# Patient Record
Sex: FEMALE | Race: WHITE | NOT HISPANIC OR LATINO | Employment: OTHER | URBAN - METROPOLITAN AREA
[De-identification: names, ages, dates, MRNs, and addresses within clinical notes are randomized per-mention and may not be internally consistent; named-entity substitution may affect disease eponyms.]

---

## 2022-08-29 ENCOUNTER — OFFICE VISIT (OUTPATIENT)
Dept: NEPHROLOGY | Facility: CLINIC | Age: 77
End: 2022-08-29
Payer: MEDICARE

## 2022-08-29 VITALS
HEART RATE: 74 BPM | BODY MASS INDEX: 25.16 KG/M2 | WEIGHT: 147.38 LBS | TEMPERATURE: 98 F | DIASTOLIC BLOOD PRESSURE: 70 MMHG | OXYGEN SATURATION: 100 % | HEIGHT: 64 IN | SYSTOLIC BLOOD PRESSURE: 155 MMHG

## 2022-08-29 DIAGNOSIS — N18.4 STAGE 4 CHRONIC KIDNEY DISEASE: Primary | ICD-10-CM

## 2022-08-29 PROCEDURE — 99203 PR OFFICE/OUTPT VISIT, NEW, LEVL III, 30-44 MIN: ICD-10-PCS | Mod: S$GLB,,, | Performed by: INTERNAL MEDICINE

## 2022-08-29 PROCEDURE — 99203 OFFICE O/P NEW LOW 30 MIN: CPT | Mod: S$GLB,,, | Performed by: INTERNAL MEDICINE

## 2022-08-29 RX ORDER — CETIRIZINE HYDROCHLORIDE 10 MG/1
10 TABLET ORAL DAILY
COMMUNITY
End: 2023-10-18

## 2022-08-29 RX ORDER — CANDESARTAN 16 MG/1
16 TABLET ORAL DAILY
COMMUNITY
Start: 2022-07-20 | End: 2023-10-18

## 2022-08-29 NOTE — PROGRESS NOTES
"Nephrology Progress Note      Patient Name:  Rosaline Lutz    :  1945    Medical Record:  [id number]    Interval History:  Sent here for elevated cr done last month. Hadn't had blood checked before that for yrs. Had covid on .      Subjective:  Patient seen and examined. No complaints.    HTN x 2 yrs.    Current Medications: Current medications reviewed.    Review of Systems  Review of Systems   HENT:  Negative for hearing loss.    Eyes:  Negative for blurred vision, double vision, photophobia and pain.   Respiratory:  Negative for cough, hemoptysis, sputum production, shortness of breath and wheezing.    Cardiovascular:  Negative for chest pain, palpitations, orthopnea, claudication, leg swelling and PND.   Gastrointestinal:  Negative for abdominal pain, blood in stool, constipation, diarrhea, heartburn, melena, nausea and vomiting.   Genitourinary:  Negative for dysuria, flank pain, frequency, hematuria and urgency.   Musculoskeletal:  Negative for myalgias and neck pain.   Skin:  Negative for rash.   Neurological:  Negative for dizziness.   Endo/Heme/Allergies:  Does not bruise/bleed easily.     Objective:      Physical Exam:   Vital Signs:  BP (!) 155/70 (BP Location: Left arm, Patient Position: Sitting, BP Method: Medium (Automatic))   Pulse 74   Temp 98.3 °F (36.8 °C) (Oral)   Ht 5' 4" (1.626 m)   Wt 66.9 kg (147 lb 6.4 oz)   SpO2 100%   BMI 25.30 kg/m²   Constitutional:  WNL  HENT:  Normocephalic, Atraumatic, Pupils Reactive, Normal oropharynx, Nose normal.   Cardiovascular:  Normal heart rate, Normal rhythm, No murmurs, No rubs, No gallops.   Respiratory:  Normal breath sounds, No respiratory distress, No wheezing, No chest tenderness.   GI:  Bowel sounds normal, Soft, No tenderness, No masses, No pulsatile masses.  : No costovertebral angle tenderness    Extremities:  Intact distal pulses, No edema, No tenderness, No cyanosis, No clubbing.   Neurologic:  Alert & oriented x 3, Normal " motor function, Normal sensory function, No focal deficits noted.  Skin:  Warm and dry      Labs:  No results for input(s): K, CO2, BUN, ALBUMIN, CALCIUM, GLU, HGB, WBC, PLT, INR in the last 72 hours.    Invalid input(s): CRE  .    Radiology:   US noted. - c/w CKD.    Assessment:  Ckd 4 - little protein. Suspect from HTN. Agree with MM work up. If MM workup is neg - will embark on more workup.        Plan:  RTC 2 months.    Electronically signed by: Jeanette Celis, 8/29/2022 2:14 PM.

## 2023-10-06 DIAGNOSIS — N18.4 CHRONIC KIDNEY DISEASE, STAGE IV (SEVERE): Primary | ICD-10-CM

## 2023-10-18 ENCOUNTER — OFFICE VISIT (OUTPATIENT)
Dept: NEPHROLOGY | Facility: CLINIC | Age: 78
End: 2023-10-18
Payer: MEDICARE

## 2023-10-18 VITALS
OXYGEN SATURATION: 95 % | SYSTOLIC BLOOD PRESSURE: 162 MMHG | BODY MASS INDEX: 21.71 KG/M2 | DIASTOLIC BLOOD PRESSURE: 72 MMHG | HEART RATE: 65 BPM | WEIGHT: 127.19 LBS | HEIGHT: 64 IN

## 2023-10-18 DIAGNOSIS — I10 PRIMARY HYPERTENSION: ICD-10-CM

## 2023-10-18 DIAGNOSIS — E83.52 HYPERCALCEMIA: ICD-10-CM

## 2023-10-18 DIAGNOSIS — N18.4 STAGE 4 CHRONIC KIDNEY DISEASE: Primary | ICD-10-CM

## 2023-10-18 PROCEDURE — 99214 OFFICE O/P EST MOD 30 MIN: CPT | Mod: S$GLB,,, | Performed by: INTERNAL MEDICINE

## 2023-10-18 PROCEDURE — 99214 PR OFFICE/OUTPT VISIT, EST, LEVL IV, 30-39 MIN: ICD-10-PCS | Mod: S$GLB,,, | Performed by: INTERNAL MEDICINE

## 2023-10-18 RX ORDER — PANTOPRAZOLE SODIUM 40 MG/1
40 TABLET, DELAYED RELEASE ORAL DAILY
COMMUNITY

## 2023-10-18 RX ORDER — AMLODIPINE BESYLATE 5 MG/1
5 TABLET ORAL DAILY
COMMUNITY

## 2023-10-18 NOTE — PROGRESS NOTES
Pt Name:  Rosaline Lutz  Pt :  1945  Pt MRN:  [id number]    Date: 10/18/2023  Provider: Jeanette Celis    Reason for visit:       Chief Complaint:   Chief Complaint   Patient presents with    Chronic Kidney Disease     Cre:2.21 gfr:22 CKD stage:4       HPI:  HPI       History:   Past Medical History:   Diagnosis Date    CKD (chronic kidney disease)     COVID-19     HLD (hyperlipidemia)     HTN (hypertension)     Vitamin D deficiency      Past Surgical History:   Procedure Laterality Date    CATARACT EXTRACTION Bilateral     CHOLECYSTECTOMY       Family History   Problem Relation Age of Onset    Dementia Mother     Diabetes Father     Heart disease Father     Diabetes Sister     Kidney failure Sister      Social History     Substance and Sexual Activity   Alcohol Use Never     Social History     Substance and Sexual Activity   Drug Use Never     Social History     Substance and Sexual Activity   Sexual Activity Not Currently     reports that she is not currently sexually active.  Social History     Tobacco Use   Smoking Status Never   Smokeless Tobacco Never       Allergies:  Review of patient's allergies indicates:   Allergen Reactions    Propoxyphene Other (See Comments)     Relaxed muscle in face       Current Outpatient Medications   Medication Sig Dispense Refill    amLODIPine (NORVASC) 5 MG tablet Take 5 mg by mouth once daily.      pantoprazole (PROTONIX) 40 MG tablet Take 40 mg by mouth once daily.       No current facility-administered medications for this visit.        ROS:   Constitutional:  Denies fever or chills   Eyes:  Denies change in visual acuity   HENT:  Denies nasal congestion or sore throat   Respiratory:  As in the history of the present illness.   Cardiovascular:  As in the history of the present illness.   GI:  As in the history of the present illness.    Musculoskeletal:  Denies back pain or joint pain   Integument:  Denies rash   Neurologic:  Denies headache, focal weakness or sensory  "changes   Endocrine:  Denies polyuria or polydipsia   Lymphatic:  Denies swollen glands   Psychiatric:  Denies depression or anxiety    Physical Exam:   Vitals:   Vitals:    10/18/23 1415   BP: (!) 162/72   Pulse: 65   SpO2: 95%   Weight: 57.7 kg (127 lb 3.2 oz)   Height: 5' 4" (1.626 m)     Body mass index is 21.83 kg/m².    Constitutional:  Well developed, well nourished, and in no acute distress   Eyes:  PERRLA, conjunctiva normal   HENT:  Atraumatic, external ears normal, nose normal.  Neck: There is no jugular venous distension or thyromegaly.   Respiratory:  No respiratory distress, normal breath sounds, no rales, no wheezing   Cardiovascular:  Normal rate, and a regular rhythm, no murmurs, no gallops, no rub, and no edema.  GI:  Normal bowel sounds.  Musculoskeletal:  No deformities.   Neurologic:  Alert & oriented x 3, CN 2-12 normal, normal motor function, and no asterixis.   Psychiatric:  Speech and behavior appropriate.    Labs/Tests:  Lab Results   Component Value Date     08/26/2022    K 4.6 08/26/2022     08/26/2022    CO2 27 08/26/2022    BUN 21 08/26/2022    CREATININE 1.97 (H) 08/26/2022    GLUCOSE 94 08/26/2022    CALCIUM 10.1 08/26/2022    PHOSPHORUS 3.3 08/26/2022    ALBUMIN 3.8 08/26/2022    EGFRNONAA 24 (L) 08/26/2022    ESTGFRAFRICA 28 (L) 08/26/2022    PTH 23 10/13/2023    IRON 82 09/25/2023    TIBC 280 09/25/2023    FERRITIN 210.0 09/25/2023       Assessment & Plan:    Visit Diagnosis:   1. Stage 4 chronic kidney disease  Renal Function Panel    PTH, Intact    CBC Auto Differential    Renal Function Panel    PTH, Intact    CBC Auto Differential      2. Primary hypertension        3. Hypercalcemia           Problem List: There is no problem list on file for this patient.  -  Ckd 4 - stable. - prob from htn    Htn    Hypercalcemia - seeing onc. Pt has normal pth.    1. Stage 4 chronic kidney disease  -     Renal Function Panel; Future; Expected date: 01/18/2024  -     PTH, Intact; " Future; Expected date: 01/18/2024  -     CBC Auto Differential; Future; Expected date: 01/18/2024    2. Primary hypertension    3. Hypercalcemia             Follow Up:   Follow up in about 3 months (around 1/18/2024).